# Patient Record
Sex: FEMALE | Race: BLACK OR AFRICAN AMERICAN | NOT HISPANIC OR LATINO | Employment: FULL TIME | URBAN - METROPOLITAN AREA
[De-identification: names, ages, dates, MRNs, and addresses within clinical notes are randomized per-mention and may not be internally consistent; named-entity substitution may affect disease eponyms.]

---

## 2017-11-07 ENCOUNTER — HOSPITAL ENCOUNTER (EMERGENCY)
Facility: HOSPITAL | Age: 37
Discharge: HOME | End: 2017-11-07

## 2017-11-07 VITALS
SYSTOLIC BLOOD PRESSURE: 148 MMHG | TEMPERATURE: 98 F | DIASTOLIC BLOOD PRESSURE: 76 MMHG | HEART RATE: 80 BPM | OXYGEN SATURATION: 98 % | RESPIRATION RATE: 18 BRPM

## 2017-11-07 DIAGNOSIS — M54.2 NECK PAIN ON LEFT SIDE: Primary | ICD-10-CM

## 2017-11-07 DIAGNOSIS — E87.6 HYPOKALEMIA: ICD-10-CM

## 2017-11-07 DIAGNOSIS — M25.512 LEFT SHOULDER PAIN: ICD-10-CM

## 2017-11-07 LAB
ANION GAP SERPL CALC-SCNC: 9 MMOL/L
BASOPHILS # BLD AUTO: 0.02 K/UL
BASOPHILS NFR BLD: 0.2 %
BNP SERPL-MCNC: <10 PG/ML
BUN SERPL-MCNC: 24 MG/DL
CALCIUM SERPL-MCNC: 9.5 MG/DL
CHLORIDE SERPL-SCNC: 105 MMOL/L
CO2 SERPL-SCNC: 26 MMOL/L
CREAT SERPL-MCNC: 1.1 MG/DL
D DIMER PPP IA.FEU-MCNC: 0.58 MG/L FEU
DIFFERENTIAL METHOD: ABNORMAL
EOSINOPHIL # BLD AUTO: 0.2 K/UL
EOSINOPHIL NFR BLD: 2.3 %
ERYTHROCYTE [DISTWIDTH] IN BLOOD BY AUTOMATED COUNT: 16.8 %
EST. GFR  (AFRICAN AMERICAN): >60 ML/MIN/1.73 M^2
EST. GFR  (NON AFRICAN AMERICAN): >60 ML/MIN/1.73 M^2
GLUCOSE SERPL-MCNC: 105 MG/DL
HCT VFR BLD AUTO: 36.4 %
HGB BLD-MCNC: 11.4 G/DL
LYMPHOCYTES # BLD AUTO: 2.4 K/UL
LYMPHOCYTES NFR BLD: 28 %
MCH RBC QN AUTO: 25.1 PG
MCHC RBC AUTO-ENTMCNC: 31.3 G/DL
MCV RBC AUTO: 80 FL
MONOCYTES # BLD AUTO: 0.7 K/UL
MONOCYTES NFR BLD: 7.9 %
NEUTROPHILS # BLD AUTO: 5.2 K/UL
NEUTROPHILS NFR BLD: 61.6 %
PLATELET # BLD AUTO: 372 K/UL
PMV BLD AUTO: 10.1 FL
POTASSIUM SERPL-SCNC: 3.4 MMOL/L
RBC # BLD AUTO: 4.54 M/UL
SODIUM SERPL-SCNC: 140 MMOL/L
TROPONIN I SERPL DL<=0.01 NG/ML-MCNC: <0.006 NG/ML
WBC # BLD AUTO: 8.38 K/UL

## 2017-11-07 PROCEDURE — 99284 EMERGENCY DEPT VISIT MOD MDM: CPT | Mod: 25

## 2017-11-07 PROCEDURE — 25500020 PHARM REV CODE 255: Performed by: PHYSICIAN ASSISTANT

## 2017-11-07 PROCEDURE — 83880 ASSAY OF NATRIURETIC PEPTIDE: CPT

## 2017-11-07 PROCEDURE — 25000003 PHARM REV CODE 250: Performed by: PHYSICIAN ASSISTANT

## 2017-11-07 PROCEDURE — 85025 COMPLETE CBC W/AUTO DIFF WBC: CPT

## 2017-11-07 PROCEDURE — 80048 BASIC METABOLIC PNL TOTAL CA: CPT

## 2017-11-07 PROCEDURE — 93010 ELECTROCARDIOGRAM REPORT: CPT | Mod: ,,, | Performed by: INTERNAL MEDICINE

## 2017-11-07 PROCEDURE — 85379 FIBRIN DEGRADATION QUANT: CPT

## 2017-11-07 PROCEDURE — 84484 ASSAY OF TROPONIN QUANT: CPT

## 2017-11-07 PROCEDURE — 93005 ELECTROCARDIOGRAM TRACING: CPT

## 2017-11-07 RX ORDER — CHLORTHALIDONE 25 MG/1
25 TABLET ORAL DAILY
COMMUNITY
End: 2017-11-07

## 2017-11-07 RX ORDER — KETOROLAC TROMETHAMINE 10 MG/1
10 TABLET, FILM COATED ORAL
Status: COMPLETED | OUTPATIENT
Start: 2017-11-07 | End: 2017-11-07

## 2017-11-07 RX ORDER — METHOCARBAMOL 750 MG/1
750 TABLET, FILM COATED ORAL 2 TIMES DAILY PRN
Qty: 15 TABLET | Refills: 0 | Status: SHIPPED | OUTPATIENT
Start: 2017-11-07

## 2017-11-07 RX ORDER — CHLORTHALIDONE 25 MG/1
25 TABLET ORAL DAILY
Qty: 30 TABLET | Refills: 0 | Status: SHIPPED | OUTPATIENT
Start: 2017-11-07

## 2017-11-07 RX ORDER — AMLODIPINE BESYLATE 10 MG/1
10 TABLET ORAL DAILY
Qty: 30 TABLET | Refills: 0 | Status: SHIPPED | OUTPATIENT
Start: 2017-11-07

## 2017-11-07 RX ORDER — DICLOFENAC SODIUM 50 MG/1
50 TABLET, DELAYED RELEASE ORAL 3 TIMES DAILY PRN
Qty: 15 TABLET | Refills: 0 | Status: SHIPPED | OUTPATIENT
Start: 2017-11-07

## 2017-11-07 RX ORDER — POTASSIUM CHLORIDE 20 MEQ/1
40 TABLET, EXTENDED RELEASE ORAL
Status: COMPLETED | OUTPATIENT
Start: 2017-11-07 | End: 2017-11-07

## 2017-11-07 RX ORDER — AMLODIPINE BESYLATE 10 MG/1
10 TABLET ORAL DAILY
COMMUNITY
End: 2017-11-07

## 2017-11-07 RX ORDER — CARVEDILOL 3.12 MG/1
3.12 TABLET ORAL 2 TIMES DAILY
Qty: 60 TABLET | Refills: 0 | Status: SHIPPED | OUTPATIENT
Start: 2017-11-07 | End: 2018-11-07

## 2017-11-07 RX ADMIN — IOHEXOL 100 ML: 350 INJECTION, SOLUTION INTRAVENOUS at 07:11

## 2017-11-07 RX ADMIN — POTASSIUM CHLORIDE 40 MEQ: 1500 TABLET, EXTENDED RELEASE ORAL at 08:11

## 2017-11-07 RX ADMIN — KETOROLAC TROMETHAMINE 10 MG: 10 TABLET, FILM COATED ORAL at 08:11

## 2017-11-07 NOTE — ED PROVIDER NOTES
SCRIBE #1 NOTE: I, Delia Rush, am scribing for, and in the presence of, CHARLENE Joshi. I have scribed the entire note.      History      Chief Complaint   Patient presents with    Shoulder Pain     left shoulder and neck pain x 1 week       Review of patient's allergies indicates:  No Known Allergies     HPI   HPI    2017, 4:51 PM   History obtained from the patient      History of Present Illness: Kamala Pineda is a 37 y.o. female patient who presents to the Emergency Department for an emergent evaluation due to acute pain to her left neck and left shoulder which onset gradually 2-3 days ago. Pt states that she has a hx of HTN, pericardial effusion, and heart surgery, and takes 3 BP medications. Pt states that she has been out of her BP medications for 2 weeks. She states that the pain waxes and wanes and is mild to moderate in severity. No exacerbating factors reported. Pt states that pain is better with movement and deep massaging. Pt states that the pain wakes her up at night the past 2 nights. She is right handed. She denies any known injury or abnormal strenuous activity that she could have strained muscles in her neck and shoulder. Patient denies any fever, chills, CP, SOB, palpitations, dizziness, n/v, abd pain, leg pain/swelling, jaw pain, diaphoresis, and all other sxs at this time. No prior Tx reported. Pt states that LMP was the first week in 2017. Pt denies pregnancy or currently breast feeding at this time. No further complaints or concerns at this time.         Arrival mode: Personal vehicle      PCP: No primary care provider on file.       Past Medical History:  Past Medical History:   Diagnosis Date    HTN (hypertension)     Pericardial effusion        Past Surgical History:  Past Surgical History:   Procedure Laterality Date     SECTION      PERICARDIAL WINDOW      TUBAL LIGATION           Family History:  History reviewed. No pertinent family history.    Social  History:  Social History     Social History Main Topics    Smoking status: Former Smoker    Smokeless tobacco: Never Used    Alcohol use No    Drug use: No    Sexual activity: Not on file       ROS   Review of Systems   Constitutional: Negative for chills, diaphoresis and fever.   HENT: Negative for sore throat.         - jaw pain    Respiratory: Negative for shortness of breath.    Cardiovascular: Negative for chest pain and leg swelling.   Gastrointestinal: Negative for abdominal pain, nausea and vomiting.   Genitourinary: Negative for dysuria.   Musculoskeletal: Positive for neck pain. Negative for back pain.        - leg pain   + shoulder pain    Skin: Negative for rash.   Neurological: Negative for weakness.   Hematological: Does not bruise/bleed easily.       Physical Exam      Initial Vitals [11/07/17 1641]   BP Pulse Resp Temp SpO2   (!) 151/84 94 18 98.3 °F (36.8 °C) 99 %      MAP       106.33          Physical Exam  Nursing Notes and Vital Signs Reviewed.  Constitutional: Patient is in minimal distress. Well-developed and well-nourished.  Head: Atraumatic. Normocephalic.  Eyes: PERRL. EOM intact. Conjunctivae are not pale. No scleral icterus.  ENT: Mucous membranes are moist.   Neck: Supple. Full ROM. No JVD. No carotid bruit. No swelling. Non-tender to palpation or during passive ROM.   Cardiovascular: Regular rate. Regular rhythm. Distal pulses are 2+ and symmetric.  Pulmonary/Chest: No respiratory distress. Clear to auscultation bilaterally. No wheezing, rales, or rhonchi. No cough. No exertional or conversational dyspnea.   Abdominal: Non-distended. Non-tender.   Musculoskeletal: Moves all extremities. No obvious deformities. No edema. No calf tenderness. L shoulder pain, diffuse, normal ROM; No TTP.    Skin: Warm and dry. No rash.   Neurological:  Alert, awake, and appropriate. Normal speech. No acute focal neurological deficits are appreciated.  Psychiatric: Normal affect. Good eye contact.  Appropriate in content.    ED Course    Procedures  ED Vital Signs:  Vitals:    11/07/17 1641 11/07/17 1810 11/07/17 2010   BP: (!) 151/84 137/78 (!) 142/86   Pulse: 94 91 86   Resp: 18 18 18   Temp: 98.3 °F (36.8 °C)     TempSrc: Oral     SpO2: 99% 98% 98%       Abnormal Lab Results:  Labs Reviewed   CBC W/ AUTO DIFFERENTIAL - Abnormal; Notable for the following:        Result Value    Hemoglobin 11.4 (*)     Hematocrit 36.4 (*)     MCV 80 (*)     MCH 25.1 (*)     MCHC 31.3 (*)     RDW 16.8 (*)     Platelets 372 (*)     All other components within normal limits   BASIC METABOLIC PANEL - Abnormal; Notable for the following:     Potassium 3.4 (*)     BUN, Bld 24 (*)     All other components within normal limits   D DIMER, QUANTITATIVE - Abnormal; Notable for the following:     D-Dimer 0.58 (*)     All other components within normal limits   TROPONIN I   B-TYPE NATRIURETIC PEPTIDE        All Lab Results:  Results for orders placed or performed during the hospital encounter of 11/07/17   CBC auto differential   Result Value Ref Range    WBC 8.38 3.90 - 12.70 K/uL    RBC 4.54 4.00 - 5.40 M/uL    Hemoglobin 11.4 (L) 12.0 - 16.0 g/dL    Hematocrit 36.4 (L) 37.0 - 48.5 %    MCV 80 (L) 82 - 98 fL    MCH 25.1 (L) 27.0 - 31.0 pg    MCHC 31.3 (L) 32.0 - 36.0 g/dL    RDW 16.8 (H) 11.5 - 14.5 %    Platelets 372 (H) 150 - 350 K/uL    MPV 10.1 9.2 - 12.9 fL    Gran # 5.2 1.8 - 7.7 K/uL    Lymph # 2.4 1.0 - 4.8 K/uL    Mono # 0.7 0.3 - 1.0 K/uL    Eos # 0.2 0.0 - 0.5 K/uL    Baso # 0.02 0.00 - 0.20 K/uL    Gran% 61.6 38.0 - 73.0 %    Lymph% 28.0 18.0 - 48.0 %    Mono% 7.9 4.0 - 15.0 %    Eosinophil% 2.3 0.0 - 8.0 %    Basophil% 0.2 0.0 - 1.9 %    Differential Method Automated    Basic metabolic panel   Result Value Ref Range    Sodium 140 136 - 145 mmol/L    Potassium 3.4 (L) 3.5 - 5.1 mmol/L    Chloride 105 95 - 110 mmol/L    CO2 26 23 - 29 mmol/L    Glucose 105 70 - 110 mg/dL    BUN, Bld 24 (H) 6 - 20 mg/dL    Creatinine 1.1 0.5  - 1.4 mg/dL    Calcium 9.5 8.7 - 10.5 mg/dL    Anion Gap 9 8 - 16 mmol/L    eGFR if African American >60 >60 mL/min/1.73 m^2    eGFR if non African American >60 >60 mL/min/1.73 m^2   Troponin I   Result Value Ref Range    Troponin I <0.006 0.000 - 0.026 ng/mL   Brain natriuretic peptide   Result Value Ref Range    BNP <10 0 - 99 pg/mL   D dimer, quantitative   Result Value Ref Range    D-Dimer 0.58 (H) <0.50 mg/L FEU         Imaging Results:  Imaging Results          CTA Chest Non-Coronary (PE Study) (Final result)  Result time 11/07/17 20:12:16    Final result by Trevin Cantor MD (11/07/17 20:12:16)                 Impression:         No acute findings. No evidence of PE        All CT scans at this facility use dose modulation, iterative reconstruction and/or weight based dosing when appropriate to reduce radiation dose to as low as reasonably achievable.       Electronically signed by: TREVIN CANTOR MD  Date:     11/07/17  Time:    20:12              Narrative:    Procedure: CTA CHEST NON CORONARY, Tuesday, November 07, 2017    Clinical history: Chest pain, left shoulder pain elevated d dimer    Technique: Standard CTA of the chest performed with 100 cc Omnipaque 350 utilizing 3-D MIP reformations.    Findings: There is good opacification of vascular structures and no evidence of PE, aortic dissection, or aneurysm. No pulmonary infiltrates, pleural effusion, or pericardial effusion. No abnormality of the airway. No pathologically enlarged hilar or mediastinal lymph nodes.                             X-Ray Chest PA And Lateral (Final result)  Result time 11/07/17 17:53:06    Final result by Trevin Cantor MD (11/07/17 17:53:06)                 Impression:     Negative      Electronically signed by: TREVIN CANTOR MD  Date:     11/07/17  Time:    17:53              Narrative:    History: Chest pain, left shoulder pain    Normal heart size. Clear lungs.                                EKG Readings:  (Independently Interpreted)   Initial Reading: No STEMI. Rhythm: Normal Sinus Rhythm. Heart Rate: 89. ST Segments: Normal ST Segments. T Waves: Normal. Axis: Left Axis Deviation.        The Emergency Provider reviewed the vital signs and test results, which are outlined above.    ED Discussion     8:31 PM. Discussed with pt all pertinent ED information and results. Discussed pt dx and plan of tx. Gave pt all f/u and return to the ED instructions. All questions and concerns were addressed at this time. Pt expresses understanding of information and instructions, and is comfortable with plan to discharge. Pt is stable for discharge.        ED Medication(s):  Medications   omnipaque 350 iohexol 100 mL (100 mLs Intravenous Given 11/7/17 1942)   potassium chloride SA CR tablet 40 mEq (40 mEq Oral Given 11/7/17 2044)   ketorolac tablet 10 mg (10 mg Oral Given 11/7/17 2044)       New Prescriptions    DICLOFENAC (VOLTAREN) 50 MG EC TABLET    Take 1 tablet (50 mg total) by mouth 3 (three) times daily as needed (PAIN).    METHOCARBAMOL (ROBAXIN) 750 MG TAB    Take 1 tablet (750 mg total) by mouth 2 (two) times daily as needed.       Follow-up Information     [facility]    Specialty:  Emergency Medicine  Why:  If symptoms worsen in any way. Follow up with your primary care physician and your cardiologist within the next 1-2 days for re-evaluation and further management.  Contact information:  [address] [facility] [address] [phone]                   Medical Decision Making    Medical Decision Making:   History:   Old Medical Records: I decided to obtain old medical records.  Initial Assessment:   Pt with past medical hx of HTN, pericardial effusion, s/p recent pericardial window, presents to the ER for an emergent evaluation due to acute waxing and waning left sided neck pain and left shoulder pain x 2-3 days that has been waking her up from sleep.     She is a former smoker.  She states that her father had coronary stents in his early 50's.   Differential Diagnosis:   ACS, MI, PE, Muscle strain, Pericardial effusion, Pericarditis, Dysrhythmia, Pneumothorax, Aortic dissection, etc   Clinical Tests:   Lab Tests: Ordered and Reviewed  Radiological Study: Ordered and Reviewed  Medical Tests: Ordered and Reviewed  ED Management:  Work-up essentially unremarkable   BP medications refilled.   Hypokalemia corrected   Rx's for NSAID and muscle relaxer provided for neck and shoulder pain.   Patient advised to follow up closely with primary care or to return to the ER promptly for any worsening symptoms.      Additional MDM:   EKG: I have independently interpreted EKG(s) - see notes.   X-Rays: I have independently interpreted X-Ray(s) - see notes.        Scribe Attestation:   Scribe #1: I performed the above scribed service and the documentation accurately describes the services I performed. I attest to the accuracy of the note.    Attending:   Physician Attestation Statement for Scribe #1: GENI KOO PA , personally performed the services described in this documentation, as scribed by Delia Rush, in my presence, and it is both accurate and complete.          Clinical Impression       ICD-10-CM ICD-9-CM   1. Neck pain on left side M54.2 723.1   2. Left shoulder pain M25.512 719.41   3. Hypokalemia E87.6 276.8       Disposition:   Disposition: Discharged  Condition: Stable         Kevin Wise PA-C  11/07/17 2046